# Patient Record
(demographics unavailable — no encounter records)

---

## 2024-10-15 NOTE — IMAGING
[de-identified] : Spine: Inspection/Palpation: No tenderness to palpation throughout Cervical/thoracic/lumbar spine. No bony stepoffs, No lesions.  Gait: antalgic, able to perform bilateral toe and heel rise.     Neurologic:  Bilateral Lower Extremities 5/5 Iliopsoas/Quadriceps/Hamstrings/ Tibialis Anterior/ Gastrocnemius. Extensor Hallucis Longus/ Flexor Hallucis Longus except    Sensation intact to light touch L2-S1  X-ray Ap/Latera of lumbar spine were viewed and interpreted.  Normal alignment is maintained without any spondylolisthesis.  multilevel degenerate changes. coronal curvature.  x-ray sacrum/coccyx.  No acute fractures visualized

## 2024-10-15 NOTE — HISTORY OF PRESENT ILLNESS
[de-identified] : The patient is a 94 year old female who presents today complaining of low back pain.  Hads fall down stairs on OCt 2nd.  Had low back pain and buttock pain since. No pain radiating down legs. No numbness weakness or tingling .States at times has some bowel incontinence which can happen when passing gas.  No numbness or tingling gin groin.  Date of Injury/Onset:  10/2/24 Pain:    At Rest:  /10 With Activity:  _/10 Mechanism of injury:  walking out to the car and had a few things in her hand and took a fall down the stairs Quality of symptoms:  slight bowel incontinence, sharp severe pain Improves with:  aleve Worse with:  walking, sitting, standing Prior treatment:   no Prior Imaging:  no Additional Information: None

## 2024-10-15 NOTE — ASSESSMENT
[FreeTextEntry1] : 94F with LBP and ? bowel incontinence for over 1 week MRI L spine rule out VCF Fu after MRI c/w OTC NSAIDS

## 2024-10-29 NOTE — HISTORY OF PRESENT ILLNESS
[de-identified] : 10/29/24 Pain perists.   Worst in lo wback. Here for MRI results   The patient is a 94 year old female who presents today complaining of low back pain.  Hads fall down stairs on OCt 2nd.  Had low back pain and buttock pain since. No pain radiating down legs. No numbness weakness or tingling .States at times has some bowel incontinence which can happen when passing gas.  No numbness or tingling gin groin.  Date of Injury/Onset:  10/2/24 Pain:    At Rest:  /10 With Activity:  _/10 Mechanism of injury:  walking out to the car and had a few things in her hand and took a fall down the stairs Quality of symptoms:  slight bowel incontinence, sharp severe pain Improves with:  aleve Worse with:  walking, sitting, standing Prior treatment:   no Prior Imaging:  no Additional Information: None

## 2024-10-29 NOTE — IMAGING
[de-identified] : Spine: Inspection/Palpation: No tenderness to palpation throughout Cervical/thoracic/lumbar spine. No bony stepoffs, No lesions.  Gait: antalgic, able to perform bilateral toe and heel rise.     Neurologic:  Bilateral Lower Extremities 5/5 Iliopsoas/Quadriceps/Hamstrings/ Tibialis Anterior/ Gastrocnemius. Extensor Hallucis Longus/ Flexor Hallucis Longus except    Sensation intact to light touch L2-S1  X-ray Ap/Latera of lumbar spine were viewed and interpreted.  Normal alignment is maintained without any spondylolisthesis.  multilevel degenerate changes. coronal curvature.  x-ray sacrum/coccyx.  No acute fractures visualized

## 2024-10-29 NOTE — DATA REVIEWED
[MRI] : MRI [Lumbar Spine] : lumbar spine [I reviewed the films/CD] : I reviewed the films/CD [FreeTextEntry1] : No report available.  No vcf. multilevel degenerative changes

## 2024-12-10 NOTE — IMAGING
[de-identified] : Spine: Inspection/Palpation: No tenderness to palpation throughout Cervical/thoracic/lumbar spine. No bony stepoffs, No lesions.  Gait: antalgic, able to perform bilateral toe and heel rise.     Neurologic:  Bilateral Lower Extremities 5/5 Iliopsoas/Quadriceps/Hamstrings/ Tibialis Anterior/ Gastrocnemius. Extensor Hallucis Longus/ Flexor Hallucis Longus except    Sensation intact to light touch L2-S1  X-ray Ap/Latera of lumbar spine were viewed and interpreted.  Normal alignment is maintained without any spondylolisthesis.  multilevel degenerate changes. coronal curvature.  x-ray sacrum/coccyx.  No acute fractures visualized

## 2024-12-10 NOTE — HISTORY OF PRESENT ILLNESS
[de-identified] : 12/10/24: Follow up lower back pain, traumatic coccydynia. Patient is accompanied by family member. She states she is feeling better, using a cane and walker with home PT 2 x week. improved with PT   10/29/24 Pain perists.   Worst in lo wback. Here for MRI results   The patient is a 94 year old female who presents today complaining of low back pain.  Hads fall down stairs on OCt 2nd.  Had low back pain and buttock pain since. No pain radiating down legs. No numbness weakness or tingling .States at times has some bowel incontinence which can happen when passing gas.  No numbness or tingling gin groin.  Date of Injury/Onset:  10/2/24 Pain:    At Rest:  /10 With Activity:  _/10 Mechanism of injury:  walking out to the car and had a few things in her hand and took a fall down the stairs Quality of symptoms:  slight bowel incontinence, sharp severe pain Improves with:  aleve Worse with:  walking, sitting, standing Prior treatment:   no Prior Imaging:  no Additional Information: None